# Patient Record
Sex: FEMALE | Race: OTHER | HISPANIC OR LATINO | URBAN - METROPOLITAN AREA
[De-identification: names, ages, dates, MRNs, and addresses within clinical notes are randomized per-mention and may not be internally consistent; named-entity substitution may affect disease eponyms.]

---
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
## 2024-03-20 ENCOUNTER — EMERGENCY (EMERGENCY)
Facility: HOSPITAL | Age: 24
LOS: 0 days | Discharge: HOME | End: 2024-03-20
Payer: COMMERCIAL

## 2024-03-20 VITALS
SYSTOLIC BLOOD PRESSURE: 115 MMHG | DIASTOLIC BLOOD PRESSURE: 68 MMHG | HEART RATE: 74 BPM | TEMPERATURE: 98 F | OXYGEN SATURATION: 95 % | RESPIRATION RATE: 16 BRPM

## 2024-03-20 VITALS
WEIGHT: 132.94 LBS | SYSTOLIC BLOOD PRESSURE: 111 MMHG | HEART RATE: 67 BPM | HEIGHT: 66 IN | OXYGEN SATURATION: 95 % | RESPIRATION RATE: 19 BRPM | DIASTOLIC BLOOD PRESSURE: 75 MMHG | TEMPERATURE: 99 F
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
## 2024-03-20 DIAGNOSIS — R51.9 HEADACHE, UNSPECIFIED: ICD-10-CM

## 2024-03-20 DIAGNOSIS — R42 DIZZINESS AND GIDDINESS: ICD-10-CM
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
## 2024-03-20 PROCEDURE — 99284 EMERGENCY DEPT VISIT MOD MDM: CPT

## 2024-03-20 RX ORDER — IBUPROFEN 200 MG
600 TABLET ORAL ONCE
Refills: 0 | Status: COMPLETED | OUTPATIENT
Start: 2024-03-20 | End: 2024-03-20

## 2024-03-20 RX ADMIN — Medication 600 MILLIGRAM(S): at 11:45
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
## 2024-03-20 NOTE — ED PROVIDER NOTE - PATIENT PORTAL LINK FT
You can access the FollowMyHealth Patient Portal offered by [facility] by registering at the following website: [email or web address]. By joining [facility]’s FollowMyHealth portal, you will also be able to view your health information using other applications (apps) compatible with our system.
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
## 2024-11-12 ENCOUNTER — EMERGENCY (EMERGENCY)
Facility: HOSPITAL | Age: 24
LOS: 1 days | Discharge: HOME | End: 2024-11-12
Attending: EMERGENCY MEDICINE
Payer: COMMERCIAL

## 2024-11-12 VITALS
HEIGHT: 66 IN | RESPIRATION RATE: 16 BRPM | SYSTOLIC BLOOD PRESSURE: 110 MMHG | DIASTOLIC BLOOD PRESSURE: 56 MMHG | HEART RATE: 71 BPM | WEIGHT: 119.93 LBS | TEMPERATURE: 98 F | OXYGEN SATURATION: 99 %

## 2024-11-12 PROCEDURE — 99283 EMERGENCY DEPT VISIT LOW MDM: CPT

## 2024-11-12 PROCEDURE — 99282 EMERGENCY DEPT VISIT SF MDM: CPT

## 2024-11-12 RX ORDER — DIPHENHYDRAMINE HCL 12.5MG/5ML
25 ELIXIR ORAL ONCE
Refills: 0 | Status: COMPLETED | OUTPATIENT
Start: 2024-11-12 | End: 2024-11-12

## 2024-11-12 RX ADMIN — Medication 25 MILLIGRAM(S): at 13:00

## 2024-11-12 NOTE — ED PROVIDER NOTE - NSFOLLOWUPINSTRUCTIONS_ED_ALL_ED_FT
Thank you for visiting our Emergency Department, it has been a pleasure taking part in your healthcare. Please follow up with your primary doctor within x48 hours.    Your discharge diagnosis is: Pruritis     Return precautions to the Emergency Department include but are not limited to: unrelenting nausea, vomiting, fever, chills, chest pain, shortness of breath, dizziness, abdominal pain, worsening pain, syncope, blood in urine or stool, headache that doesn't resolve, numbness or tingling, loss of sensation, loss of motor function, or any other concerning symptoms.     take benadryl 25 mg every 8 hrs while feeling itchiness.  this medication will make you sleepy. do not drive while taking this medication     Use  moisturizers for dry skin  daily  - try Aveeno eczema  cream    Follow up with your primary care provider  in the next week  or medicine clinic [phone]

## 2024-11-12 NOTE — ED PROVIDER NOTE - OBJECTIVE STATEMENT
23 yo female in blue 33 presents to the ER for evaluation of  feeling itchiness for the past 6 days .  PT awake alert oriented x3  states " I started to have this itchy feeling 6 days ago.  I took an antibiotic 2 weks ago but did not have any issue while taking it and I have been done with the medicine for over a week". Pt denies  cp, sob , dizzi ness  or  nausea/vomiting.   Pt I no distress,  airway patent , speaking clearly,   lungs clear.

## 2024-11-12 NOTE — ED ADULT NURSE NOTE - NSFALLUNIVINTERV_ED_ALL_ED
Bed/Stretcher in lowest position, wheels locked, appropriate side rails in place/Call bell, personal items and telephone in reach/Instruct patient to call for assistance before getting out of bed/chair/stretcher/Non-slip footwear applied when patient is off stretcher/[place] to call system/Physically safe environment - no spills, clutter or unnecessary equipment/Purposeful proactive rounding/Room/bathroom lighting operational, light cord in reach

## 2024-11-12 NOTE — ED PROVIDER NOTE - PATIENT PORTAL LINK FT
You can access the FollowMyHealth Patient Portal offered by [facility] by registering at the following website: [email or web address]. By joining [email or web address]’s FollowMyHealth portal, you will also be able to view your health information using other applications (apps) compatible with our system.

## 2024-11-12 NOTE — ED PROVIDER NOTE - CLINICAL SUMMARY MEDICAL DECISION MAKING FREE TEXT BOX
pt with 6 days of  days of skin pruritus, no rash   no new products  ,  antihistamines, pt took pepcid prior to arrival pt with 6 days of  days of skin pruritus, no rash   no new products  ,  antihistamines, pt took pepcid prior to arrival    Iftikhar: Patient with 6 days of pruirits, mostly on b/l hands. no new products. may occur after getting her nails done. PE: alert, nad, nonlabored respirations, + s1s2, no focal deficits, no rash visualized, no erythema to skin. will give benadryl, recommend eliminating possible allergens.

## 2024-11-26 NOTE — ED ADULT NURSE NOTE - NSSEPSISSUSPECTED_ED_A_ED
Contacted [facility] c/o Lyla. Confirmed hospice referral is received and still under pending status. Initial consult completed and follow up scheduled in 2 weeks on 12/9. Upon initial consult patient determined ineligible for hospice care.    No